# Patient Record
Sex: MALE | Race: ASIAN | NOT HISPANIC OR LATINO | Employment: FULL TIME | URBAN - METROPOLITAN AREA
[De-identification: names, ages, dates, MRNs, and addresses within clinical notes are randomized per-mention and may not be internally consistent; named-entity substitution may affect disease eponyms.]

---

## 2022-05-07 ENCOUNTER — HOSPITAL ENCOUNTER (EMERGENCY)
Facility: HOSPITAL | Age: 27
Discharge: HOME | End: 2022-05-08
Attending: EMERGENCY MEDICINE | Admitting: EMERGENCY MEDICINE
Payer: COMMERCIAL

## 2022-05-07 VITALS
HEART RATE: 85 BPM | OXYGEN SATURATION: 100 % | SYSTOLIC BLOOD PRESSURE: 149 MMHG | RESPIRATION RATE: 20 BRPM | WEIGHT: 165 LBS | TEMPERATURE: 99 F | DIASTOLIC BLOOD PRESSURE: 93 MMHG

## 2022-05-07 DIAGNOSIS — M79.5 FOREIGN BODY (FB) IN SOFT TISSUE: ICD-10-CM

## 2022-05-07 PROCEDURE — 99283 EMERGENCY DEPT VISIT LOW MDM: CPT

## 2022-05-07 PROCEDURE — 250N000013 HC RX MED GY IP 250 OP 250 PS 637: Performed by: EMERGENCY MEDICINE

## 2022-05-07 RX ORDER — CEPHALEXIN 500 MG/1
500 CAPSULE ORAL 3 TIMES DAILY
Qty: 9 CAPSULE | Refills: 0 | Status: SHIPPED | OUTPATIENT
Start: 2022-05-07 | End: 2022-05-10

## 2022-05-07 RX ORDER — CEPHALEXIN 500 MG/1
500 CAPSULE ORAL ONCE
Status: COMPLETED | OUTPATIENT
Start: 2022-05-08 | End: 2022-05-07

## 2022-05-07 RX ADMIN — CEPHALEXIN 500 MG: 500 CAPSULE ORAL at 23:56

## 2022-05-08 NOTE — ED PROVIDER NOTES
EMERGENCY DEPARTMENT NOTE     Name: Lis Black    Age/Sex: 27 year old male   MRN: [id number]   Evaluation Date & Time:  5/7/2022 10:08 PM    PCP:    No Ref-Primary, Physician   ED Provider: Mele Jorgensen D.O.       CHIEF COMPLAINT    Foreign Body in Skin       DIAGNOSIS & DISPOSITION     1. Foreign body (FB) in soft tissue      DISPOSITION: Home    At the conclusion of the encounter I discussed the results of all of the tests and the disposition. The questions were answered. The patient or family acknowledged understanding and was agreeable with the care plan.    TOTAL CRITICAL CARE TIME (EXCLUDING PROCEDURES): Not applicable    PROCEDURES:     PROCEDURE: Foreign Body Removal   INDICATIONS: Foreign Body   PROCEDURE PROVIDER: Dr Mele Jorgensen   SITE: Left cheek   CONSENT: Verbal   TIME OUT: NA   MEDICATION:  3 cc of 1% lidocaine without epinephrine were injected around the fishhook   DESCRIPTION OF PROCEDURE:  The lower was clipped and then the patient was able to be removed with gentle traction   COMPLICATIONS: Patient tolerated procedure well, without complication       EMERGENCY DEPARTMENT COURSE/MEDICAL DECISION MAKING   11:38 PM I met with the patient to gather history and to perform my initial exam.  We discussed treatment options and the plan for care while in the Emergency Department. PPE: Provider wore gloves, N95 mask, eye protection, surgical cap, and paper mask.  11:43 PM Performed foreign body removal at this time.   12:14 AM Discussed discharge. The patient is agreeable with this plan.     Lis Black is a 27 year old male who presents to the emergency department for embedded fishhook in his left cheek  Triage note reviewed:    pt has fish hook in left cheek, occurred about an hour ago.  Unsure of tetanus status.   Patient reported to me that he had a tetanus within the past 5 years.    Vital signs:BP (!) 149/93   Pulse 85   Temp 99  F (37.2  C) (Temporal)   Resp 20   Wt 74.8 kg (165 lb)   SpO2 100%    Pertinent physical exam findings:  HEENT: [place] in the left cheek.  Diagnostic studies:    Interventions: [place] removal as per procedure note, cephalexin  Medical decision making: Patient will be discharged.  To do routine wound care.  We will have the patient take cephalexin for 3 days for cellulitis prophylaxis.  Return criteria discussed and if the patient develops redness or swelling in the area of the foreign body will return to the emergency department.    ED INTERVENTIONS     Medications   cephALEXin (KEFLEX) capsule 500 mg (500 mg Oral Given 5/7/22 3969)       DISCHARGE MEDICATIONS        Review of your medicines      START taking      Dose / Directions   cephALEXin 500 MG capsule  Commonly known as: KEFLEX      Dose: 500 mg  Take 1 capsule (500 mg) by mouth 3 times daily for 3 days  Quantity: 9 capsule  Refills: 0           Where to get your medicines      Some of these will need a paper prescription and others can be bought over the counter. Ask your nurse if you have questions.    Bring a paper prescription for each of these medications    cephALEXin 500 MG capsule           INFORMATION SOURCE AND LIMITATIONS    History/Exam limitations: None  Patient information was obtained from: the patient  Use of : N/A    HISTORY OF PRESENT ILLNESS   Lis Black is a 27 year old year old male with a limited past history of acute pharyngitis, who presents to this ED for evaluation of foreign body in skin.    The patient was fishing earlier today when a fish hook got stuck in his left cheek. He denies any recent sore throat, fever, or URI symptoms. The patient denies any other symptoms at this time.    Last tetanus 2007.    REVIEW OF SYSTEMS:   Constitutional: Negative for fever.   HENT: Negative for URI symptoms or sore throat.    Cardiac: Negative for  chest pain,palpitations, near syncope or syncope  Respiratory: Negative for cough and shortness of breath.    Gastrointestinal: Negative for abdominal  pain, nausea, vomiting, constipation, diarrhea, rectal bleeding or melena.  Genitourinary: Negative for dysuria, flank pain and hematuria.   Musculoskeletal: Negative for back pain.   Skin: Negative for rash Positive for fish hook in left cheek.   Neurological: Negative for dizziness, headache, syncope, speech difficulty, unilateral weakness or imbalance with walking.   Hematological: Negative for adenopathy. Does not bruise/bleed easily.   Psychiatric/Behavioral: Negative for confusion.       PATIENT HISTORY   No past medical history on file.  Patient Active Problem List   Diagnosis     Acute Pharyngitis Streptococcus     No past surgical history on file.  Social Histrory  Smoking:  Alcohol Use:  No Known Allergies      OUTPATIENT MEDICATIONS     Discharge Medication List as of 5/8/2022 12:02 AM      START taking these medications    Details   cephALEXin (KEFLEX) 500 MG capsule Take 1 capsule (500 mg) by mouth 3 times daily for 3 days, Disp-9 capsule, R-0, Local Print            Vitals:    05/07/22 2206   BP: (!) 149/93   Pulse: 85   Resp: 20   Temp: 99  F (37.2  C)   TempSrc: Temporal   SpO2: 100%   Weight: 74.8 kg (165 lb)       Physical Exam   Constitutional: Oriented to person, place, and time. Appears well-developed and well-nourished.   HEENT:    Head: Single rachel in left cheek.  Neck: Normal range of motion. Neck supple.   Cardiovascular: Normal rate, regular rhythm and normal heart sounds.    Pulmonary/Chest: Normal effort  and breath sounds normal.   Skin: Skin is warm and dry. Single rachel in left cheek.      DIAGNOSTICS    LABORATORY FINDINGS (REVIEWED AND INTERPRETED):  Labs Ordered and Resulted from Time of ED Arrival to Time of ED Departure - No data to display      IMAGING (REVIEWED AND INTERPRETED):  No orders to display           ECG (REVIEWED AND INTERPRETED):   None      Venus JAY, am serving as a scribe to document services personally performed by Mele Jorgensen D.O., based on my  observation and the provider s statements to me.    I, Mele Jorgensen D.O., attest that Venus Correa is acting in a scribe capacity, has observed my performance of the services and has documented them in accordance with my direction.    Mele Jorgensen D.O.  EMERGENCY MEDICINE   05/07/22  [facility] EMERGENCY DEPARTMENT  [address] [place] [id number]  [phone]  Dept: [phone]     Mele Jorgensen DO  05/08/22 0126

## 2022-05-08 NOTE — DISCHARGE INSTRUCTIONS
Cleanse the wound on your cheek daily with warm soapy water apply bacitracin.  Continue cephalexin for this.  If you develop any signs of infection redness swelling drainage return to the emergency department.